# Patient Record
Sex: FEMALE | Race: AMERICAN INDIAN OR ALASKA NATIVE
[De-identification: names, ages, dates, MRNs, and addresses within clinical notes are randomized per-mention and may not be internally consistent; named-entity substitution may affect disease eponyms.]

---

## 2019-01-02 NOTE — EMERGENCY DEPARTMENT REPORT
ED Headache HPI





- General


Chief Complaint: Headache


Stated Complaint: HEADACHE


Time Seen by Provider: 01/02/19 13:53





- History of Present Illness


Initial Comments: 





This is a 23-year-old female nontoxic, well nourished in appearance, no acute 

signs of distress presents to the ED with c/o of intermittent headache x3 days. 

Patient describes headache as diffuse with level of 3 out of 10.  Patient denies

thunderclap headache.  Patient denies any radiation of pain.  Patient denies any

head trauma.  Patient denies any visual changes.  Patient denies worse headache.

 Patient stated that darkness makes headache better and bright lights make the 

headache worse.  Patient also stated that she has been taking OTC extra strength

Tylenol which subsided her headaches.  Patient denies any numbness, tingling, 

fever, chills, nausea, vomiting, chest pain, shortness of breath, stiff neck.  

Patient denies any radiation of pain.  Patient denies any allergies.  Patient 

stated PMH includes HTN.


Timing/Duration: episodic


Quality: mild, achy


Recent Head Trauma: no recent headache/trauma


Associated Symptoms: denies symptoms.  denies: confusion, fatigue, facial pain, 

fever/chills, flushing, loss of consciousness, nausea/vomiting, nasal 

congestion, nasal drainage, numbness in legs/feet, rash, seizures, sinus 

infection, stiff neck, vision changes, weakness


Allergies/Adverse Reactions: 


Allergies





No Known Allergies Allergy (Unverified 01/02/19 13:27)


   








Home Medications: 


Ambulatory Orders





Butalb/Acetamin/Caff -40 [Fioricet] 1 tab PO Q6HR PRN #12 tab 01/02/19 


amLODIPine [Norvasc] 5 mg PO DAILY #30 tab 01/02/19 











ED Review of Systems


ROS: 


Stated complaint: HEADACHE


Other details as noted in HPI





Constitutional: denies: chills, fever


Eyes: denies: eye pain, eye discharge, vision change


ENT: denies: ear pain, throat pain


Respiratory: denies: cough, shortness of breath, wheezing


Cardiovascular: denies: chest pain, palpitations


Endocrine: no symptoms reported


Gastrointestinal: denies: abdominal pain, nausea, diarrhea


Genitourinary: denies: urgency, dysuria, discharge


Musculoskeletal: denies: back pain, joint swelling, arthralgia


Skin: denies: rash, lesions


Neurological: headache.  denies: weakness, paresthesias


Psychiatric: denies: anxiety, depression


Hematological/Lymphatic: denies: easy bleeding, easy bruising





ED Past Medical Hx





- Past Medical History


Hx Hypertension: Yes (does not take med)





- Surgical History


Past Surgical History?: No





- Social History


Smoking Status: Never Smoker


Substance Use Type: None





- Medications


Home Medications: 


                                Home Medications











 Medication  Instructions  Recorded  Confirmed  Last Taken  Type


 


Butalb/Acetamin/Caff -40 1 tab PO Q6HR PRN #12 tab 01/02/19  Unknown Rx





[Fioricet]     


 


amLODIPine [Norvasc] 5 mg PO DAILY #30 tab 01/02/19  Unknown Rx














ED Physical Exam





- General


Limitations: No Limitations


General appearance: alert, in no apparent distress





- Head


Head exam: Present: atraumatic, normocephalic





- Eye


Eye exam: Present: normal appearance, PERRL, EOMI





- Neck


Neck exam: Present: normal inspection, full ROM.  Absent: tenderness, 

meningismus, lymphadenopathy





- Extremities Exam


Extremities exam: Present: normal inspection, full ROM





- Back Exam


Back exam: Present: normal inspection, full ROM





- Neurological Exam


Neurological exam: Present: alert, oriented X3, normal gait





- Expanded Neurological Exam


  ** Expanded


Patient oriented to: Present: person, place, time


Cranial nerves: EOM's Intact: Normal, Facial Sensation: Normal


Cerebellar function: Finger to Nose: Normal


Upper motor neuron: Pronator Drift: Normal, Sensory Extinction: Normal


Sensory exam: Upper Extremity Light Touch: Normal, Upper Extremity Pin Prick: 

Normal, Upper Extremity Temperature: Normal, UE 2 Point Discrimination: Normal, 

Lower Extremity Light Touch: Normal, Lower Extremity Pin Prick: Normal, Lower 

Extremity Temperature: Normal, LE 2 Point Discrimination: Normal


Motor strength exam: RUE: 5, LUE: 5, RLE: 5, LLE: 5


Best Eye Response ([place]): (4) open spontaneously


Best Motor Response ([place]): (6) obeys commands


Best Verbal Response ([place]): (5) oriented


[place] Total: 15





- Psychiatric


Psychiatric exam: Present: normal affect, normal mood





- Skin


Skin exam: Present: warm, dry, intact, normal color.  Absent: rash





ED Course





                                   Vital Signs











  01/02/19





  13:25


 


Temperature 98.6 F


 


Pulse Rate 92 H


 


Respiratory 18





Rate 


 


Blood Pressure 167/108


 


O2 Sat by Pulse 100





Oximetry 














- Reevaluation(s)


Reevaluation #1: 





01/02/19 14:30


Patient is speaking in full sentences with no signs of distress noted.





ED Medical Decision Making





- Medical Decision Making





This is a 23-year-old female that presents with headache and HTN.  Patient is 

stable and was examined by me.  Patient is neurologically stable.  There is no 

stiff neck or neck pain.  Vital signs are stable.  Patient is afebrile.  Patient

 received Benadryl, Reglan, Toradol, and 1 L of normal saline which the patient 

stated that headache has subsided and resolved.  Patient also recevied Catapres 

in the ED.  patient was instructed to keep a daily diary of blood pressure and 

presented to primary care doctor.  Patient was instructed not to operate any 

machinery after discharged due to drowsiness of Benadryl.  Patient stated that a

 family member will drive patient home.  Patient is discharged with Fioricet and

 Norvasc.  Patient was referred to Follow-up with a primary care/neurologist 

doctor in 3-5 days or if symptoms worsen and continue return to emergency room 

as soon as possible.  At time of discharge, the patient does not seem toxic or 

ill in appearance.  No acute signs of distress noted.  Patient agrees to 

discharge treatment plan of care.  No further questions noted by the patient.


Critical care attestation.: 


If time is entered above; I have spent that time in minutes in the direct care 

of this critically ill patient, excluding procedure time.








ED Disposition


Clinical Impression: 


Headache


Qualifiers:


 Headache type: unspecified Headache chronicity pattern: acute headache 

Intractability: not intractable Qualified Code(s): R51 - Headache





Hypertension


Qualifiers:


 Hypertension type: unspecified Qualified Code(s): I10 - Essential (primary) 

hypertension





Disposition: DC-01 TO HOME OR SELFCARE


Is pt being admited?: No


Does the pt Need Aspirin: No


Condition: Stable


Instructions:  Hypertension (ED), Low Sodium Diet (ED)


Additional Instructions: 


Follow-up with a primary care doctor in 3-5 days or if symptoms worsen and 

continue return to emergency room as soon as possible. 


Keep a daily diary of your blood pressure and present it to primary care doctor.


Prescriptions: 


amLODIPine [Norvasc] 5 mg PO DAILY #30 tab


Butalb/Acetamin/Caff -40 [Fioricet] 1 tab PO Q6HR PRN #12 tab


 PRN Reason: Headache


Referrals: 


PRIMARY CARE,MD [Primary Care Provider] - 3-5 Days


BREANNE CASANOVA MD [Staff Physician] - 3-5 Days


[facility] [Outside] - 3-5 Days


[facility] [Outside] - 3-5 Days


Forms:  Work/School Release Form(ED)

## 2019-03-31 NOTE — EMERGENCY DEPARTMENT REPORT
Minor Respiratory





- HPI


Chief Complaint: Sore Throat


Stated Complaint: SORE THROAT


Time Seen by Provider: 03/31/19 00:07


Duration: 3 Days


Pain Location: Throat


Severity: mild


Minor Respiratory: Yes Sore Throat, Yes Able to Tolerate Fluids, Yes Fever, No 

Rhinorrhea, No Ear Pain, No Cough, No Sick Contacts, No Hemoptysis, No Chest 

Pain, No Shortness of Breath


Other History: SORE THROAT FOR 3 DAYS. FEVER. ABC INTACT. VSS.  CONTROLLING 

SECRETIONS.  PMH.  HTN.  RX.  NONE





ED Review of Systems


ROS: 


Stated complaint: SORE THROAT


Other details as noted in HPI





Comment: All other systems reviewed and negative


Constitutional: see HPI, fever.  denies: chills


Eyes: denies: eye pain


ENT: as per HPI, throat pain


Respiratory: denies: orthopnea


Cardiovascular: denies: palpitations


Endocrine: denies: flushing


Gastrointestinal: denies: nausea


Genitourinary: denies: dysuria


Musculoskeletal: denies: back pain


Skin: denies: lesions


Neurological: denies: headache


Psychiatric: denies: anxiety


Hematological/Lymphatic: denies: easy bleeding





ED Past Medical Hx





- Past Medical History


Previous Medical History?: Yes


Hx Hypertension: Yes


Additional medical history: Obesity





- Surgical History


Past Surgical History?: No





- Family History


Family history: no significant





- Social History


Smoking Status: Never Smoker


Substance Use Type: None





- Medications


Home Medications: 


                                Home Medications











 Medication  Instructions  Recorded  Confirmed  Last Taken  Type


 


Amoxicillin 500 mg PO BID #20 capsule 03/31/19  Unknown Rx














Minor Respiratory Exam





- Exam


General: 


Vital signs noted. No distress. Alert and acting appropriately.





HEENT: Yes Pharyngeal Erythema, Yes Pharyngeal Exudates, Yes Moist Mucous 

Membranes, No Rhinorrhea, No Conjuctival Injection, No Frontal Tenderness, No 

Maxillary Tenderness


Ear: Neither TM Bulge, Neither TM Erythema, Neither EAC Pain, Neither EAC 

Discharge


Neck: No Adenopathy, No Supple


Lungs: Yes Good Air Exchange, No Wheezes, No Ronchi, No Stridor, No Cough, No 

Labored Respirations, No Retractions, No Use of Accessory Muscles, No Other 

Abnormal Lung Sounds


Heart: Yes Regular, No Murmur


Abdomen: Yes Normal Bowel Sounds, No Tenderness, No Peritoneal Signs


Skin: No Rash, No Edema


Neurologic: 


Alert and oriented, no deficits.








Musculoskeletal: 


Unremarkable.











ED Course


                                   Vital Signs











  03/30/19





  23:33


 


Temperature 100.1 F H


 


Pulse Rate 111 H


 


Respiratory 18





Rate 


 


Blood Pressure 171/105


 


O2 Sat by Pulse 98





Oximetry 














ED Medical Decision Making





- Medical Decision Making





                                   Vital Signs











  03/30/19





  23:33


 


Temperature 100.1 F H


 


Pulse Rate 111 H


 


Respiratory 18





Rate 


 


Blood Pressure 171/105


 


O2 Sat by Pulse 98





Oximetry 








MEDICATED IN ER





HAS NOT TAKEN HER BP MEDS BECAUSE HER THROAT HURTS


CLONIDINE PO IN ER





DC HOME WITH DC PLAN OF CARE AND FOLLOW UP WITH PCP


Critical care attestation.: 


If time is entered above; I have spent that time in minutes in the direct care 

of this critically ill patient, excluding procedure time.








ED Disposition


Clinical Impression: 


 Exudative pharyngitis, HTN (hypertension)





Disposition: DC-01 TO HOME OR SELFCARE


Is pt being admited?: No


Does the pt Need Aspirin: No


Condition: Stable


Instructions:  Pharyngitis (ED), Hypertension (ED)


Additional Instructions: 


HYDRATE WELL WITH WATER





FOLLOW UP PCP IF PERSISTS





ACTIVITY AS TOLERATED





DIET AS TOLERATED





MED AS ORDERED





MOTRIN OR TYLENOL FOR PAIN OR FEVER














Prescriptions: 


Amoxicillin 500 mg PO BID #20 capsule


Referrals: 


[facility] [Outside] - 3-5 Days


Time of Disposition: 00:12

## 2019-08-11 NOTE — EMERGENCY DEPARTMENT REPORT
- General


Chief Complaint: Wound/Laceration


Stated Complaint: GASH ON LEFT ARM, HIT BY METAL DOOR


Time Seen by Provider: 19 00:51


Source: patient


Mode of arrival: Ambulatory


Limitations: No Limitations





- History of Present Illness


Initial Comments: 


Pt is a 24-year-old female who presents for left forearm laceration versus 

metabolic at work last tetanus is unknown and is 2 cm bleeding controlled by 

direct pressure applied at work there is no numbness tingling no no tendon or 

muscle damage range of motion remains intact


 


Onset/Timin


-: hour(s)


Extremity Location: Left: Forearm


Place: work


Patient Tetanus UTD: No


Context: accidental


Associated Symptoms: pain





- Related Data


                                  Previous Rx's











 Medication  Instructions  Recorded  Last Taken  Type


 


Amoxicillin 500 mg PO BID #20 capsule 19 Unknown Rx


 


cephALEXin [Keflex] 500 mg PO Q8HR 10 Days #30 cap 19 Unknown Rx


 


traMADol [Ultram] 50 mg PO Q6HR PRN #12 tablet 19 Unknown Rx











                                    Allergies











Allergy/AdvReac Type Severity Reaction Status Date / Time


 


No Known Allergies Allergy   Verified 19 14:32














ED Review of Systems


ROS: 


Stated complaint: GASH ON LEFT ARM, HIT BY METAL DOOR


Other details as noted in HPI





Constitutional: denies: chills, fever


Eyes: denies: eye pain, eye discharge, vision change


ENT: denies: ear pain, throat pain


Respiratory: denies: cough, shortness of breath, wheezing


Cardiovascular: denies: chest pain, palpitations


Endocrine: no symptoms reported


Gastrointestinal: denies: abdominal pain, nausea, vomiting, diarrhea


Genitourinary: denies: urgency, dysuria, discharge


Musculoskeletal: denies: back pain, joint swelling, arthralgia


Skin: other (laceration 2 cm left foream ).  denies: rash, lesions


Neurological: denies: headache, weakness, paresthesias


Psychiatric: denies: anxiety, depression


Hematological/Lymphatic: denies: easy bleeding, easy bruising





ED Past Medical Hx





- Past Medical History


Previous Medical History?: Yes


Hx Hypertension: Yes


Additional medical history: Obesity





- Surgical History


Past Surgical History?: No





- Social History


Smoking Status: Never Smoker


Substance Use Type: None





- Medications


Home Medications: 


                                Home Medications











 Medication  Instructions  Recorded  Confirmed  Last Taken  Type


 


Amoxicillin 500 mg PO BID #20 capsule 19  Unknown Rx


 


cephALEXin [Keflex] 500 mg PO Q8HR 10 Days #30 cap 19  Unknown Rx


 


traMADol [Ultram] 50 mg PO Q6HR PRN #12 tablet 19  Unknown Rx














ED Physical Exam





- General


Limitations: No Limitations


General appearance: alert, in no apparent distress





- Head


Head exam: Present: atraumatic, normocephalic





- Eye


Eye exam: Present: normal appearance, PERRL, EOMI


Pupils: Present: normal accommodation





- ENT


ENT exam: Present: mucous membranes moist





- Neck


Neck exam: Present: normal inspection, tenderness, full ROM, lymphadenopathy





- Respiratory


Respiratory exam: Present: normal lung sounds bilaterally.  Absent: wheezes, 

stridor, chest wall tenderness





- Cardiovascular


Cardiovascular Exam: Present: regular rate, normal rhythm, normal heart sounds. 

 Absent: systolic murmur, diastolic murmur, rubs, gallop





- GI/Abdominal


GI/Abdominal exam: Present: soft, normal bowel sounds.  Absent: distended, 

tenderness, bruit, hernia





- Rectal


Rectal exam: Present: deferred





- Extremities Exam


Extremities exam: Present: normal inspection, full ROM, tenderness (left forearm

 laceration), normal capillary refill.  Absent: pedal edema, joint swelling





- Expanded Upper Extremity Exam


  ** Left


General: Present: laceration


Forearm Wrist exam: Present: full ROM, tenderness, laceration.  Absent: 

swelling, abrasion, ecchymosis, deformity, crepidus, dislocation, erythema, 

tenderness over anatomical snuff box, pain with axial thumb loading


Neuro motor exam: Present: wrist extension intact, thumb opposition intact, 

thumb IP flexion intact, thumb adduction intact, fingers 2-5 abduction intact


Neurosensory exam: Present: 2-point discrimination, radial nerve intact, ulnar 

nerve intact, median nerve intact


Vascular: Present: normal capillary refill, radial pulse, brachial pulse, ulnar 

pulse.  Absent: pulse deficit radial art, pulse deficit ulnar art, pulse deficit

 brachial art





- Back Exam


Back exam: Present: normal inspection, full ROM, muscle spasm.  Absent: tender

ness, CVA tenderness (R), CVA tenderness (L), paraspinal tenderness, vertebral 

tenderness, rash noted





- Neurological Exam


Neurological exam: Present: alert, oriented X3, CN II-XII intact, normal gait, 

reflexes normal.  Absent: motor sensory deficit





- Psychiatric


Psychiatric exam: Present: normal affect, normal mood





- Skin


Skin exam: Present: warm, dry, intact, normal color.  Absent: rash





ED Course





                                   Vital Signs











  08/10/19 08/10/19 08/11/19





  23:03 23:15 01:14


 


Temperature 98.3 F  


 


Pulse Rate 98 H  


 


Respiratory 20  16





Rate   


 


Blood Pressure 191/105  


 


Blood Pressure  177/102 





[Left]   


 


O2 Sat by Pulse 99  





Oximetry   














- Laceration /Wound Repair


  ** Left Posterior Arm


Wound Location: upper extremity


Wound Length (cm): 2


Wound's Depth, Shape: superficial


Wound Explored: clean


Irrigated w/ Saline (ccs): 20


Betadine Prep?: Yes


Anesthesia: 1% Lidocaine


Volume Anesthetic (ccs): 2


Wound Debrided: none required 


Wound Repaired With: sutures


Suture Size/Type: 4:0, proline


Number of Sutures: 7 (running )


Layer Closure?: No


Sterile Dressing Applied?: Yes


Progress: 


Left forearm laceration 2 cm posterior forearm wound clean and Betadine solution

 and anesthesia with 1% lidocaine plain 2 mL 20 mL sterile saline no foreign 

bodies noted warm male explored with blunt forceps were closed with 3. 0 Prolene

 7 sutures running edges are well approximated wound was contrasted dressing 

applied there is no nerve muscle or tendon damage CMS is intact patient 

instructions. 








ED Medical Decision Making





- Medical Decision Making





 pt for laceration repair see procedure note, all bleeding is controlled tetanus

 given, pt will follow up with pcp in 2 days for wound check  and 7-10 days for 

suture removal pt verbalized agreement and understanding of same.  pt with htn 

episode, no symptoms no cp no sob no dizziness lightheadedness no back no n/v 


Critical care attestation.: 


If time is entered above; I have spent that time in minutes in the direct care 

of this critically ill patient, excluding procedure time.








ED Disposition


Clinical Impression: 


Laceration of forearm, left


Qualifiers:


 Encounter type: initial encounter Qualified Code(s): S51.812A - Laceration 

without foreign body of left forearm, initial encounter





Disposition: - TO HOME OR SELFCARE


Is pt being admited?: No


Does the pt Need Aspirin: No


Condition: Stable


Instructions:  Laceration (ED), Suture Care (ED)


Prescriptions: 


cephALEXin [Keflex] 500 mg PO Q8HR 10 Days #30 cap


traMADol [Ultram] 50 mg PO Q6HR PRN #12 tablet


 PRN Reason: Pain


Referrals: 


[facility], [facility] [Primary Care Provider] - 3-5 Days


Forms:  Work/School Release Form(ED)


Time of Disposition: 01:50

## 2019-08-19 NOTE — EMERGENCY DEPARTMENT REPORT
Suture/Staple Removal





- HPI


Chief Complaint: Medical Clearance


Stated Complaint: SUTURE REMOVAL


Time Seen by Provider: 08/19/19 11:57


When Sutures or Staples Placed: 5-7 Days Ago


Wound Location: left forearm 





ED Review of Systems


ROS: 


Stated complaint: SUTURE REMOVAL


Other details as noted in HPI





Comment: All other systems reviewed and negative





ED Past Medical Hx





- Past Medical History


Hx Hypertension: Yes


Additional medical history: Obesity





- Social History


Smoking Status: Never Smoker


Substance Use Type: None





- Medications


Home Medications: 


                                Home Medications











 Medication  Instructions  Recorded  Confirmed  Last Taken  Type


 


Amoxicillin 500 mg PO BID #20 capsule 03/31/19  Unknown Rx


 


cephALEXin [Keflex] 500 mg PO Q8HR 10 Days #30 cap 08/11/19  Unknown Rx


 


traMADol [Ultram] 50 mg PO Q6HR PRN #12 tablet 08/11/19  Unknown Rx














Suture Removal Exam





- Exam


General: 


Vital signs noted. No distress. Alert and acting appropriately.





Wound: No Pathologic Erythema, No Tenderness, No Drainage, No Pus, No Wound 

Dehiscence


Other Systems: 


All other systems reviewed and are unremarkable.








ED Recheck MDM





- Differential Diagnosis


Suture/Staple Removal


Critical care attestation.: 


If time is entered above; I have spent that time in minutes in the direct care 

of this critically ill patient, excluding procedure time.








ED Disposition


Clinical Impression: 


 Visit for suture removal





Disposition: DC-01 TO HOME OR SELFCARE


Is pt being admited?: No


Does the pt Need Aspirin: No


Condition: Stable


Instructions:  Suture Removal (ED)

## 2020-03-29 ENCOUNTER — HOSPITAL ENCOUNTER (EMERGENCY)
Dept: HOSPITAL 5 - ED | Age: 25
LOS: 1 days | Discharge: HOME | End: 2020-03-30
Payer: SELF-PAY

## 2020-03-29 VITALS — SYSTOLIC BLOOD PRESSURE: 153 MMHG | DIASTOLIC BLOOD PRESSURE: 94 MMHG

## 2020-03-29 DIAGNOSIS — L02.213: Primary | ICD-10-CM

## 2020-03-29 DIAGNOSIS — Z79.899: ICD-10-CM

## 2020-03-29 DIAGNOSIS — I10: ICD-10-CM

## 2020-03-29 NOTE — EMERGENCY DEPARTMENT REPORT
Abscess Boil HPI





- HPI


Chief Complaint: Skin/Abscess/Foreign Body


Stated Complaint: KNOT IN CHEST CAUSING CHEST PAIN X3 DAYS


Time Seen by Provider: 03/29/20 23:08


Location: Chest


Severity: Moderate


History: Yes Pain (Mid chest), No Fever (6/10 with touch and movement), No 

Purulent Drainage, No Numbness, No Foreign Body, No Previous History, No Insect 

Bite


HPI: Patient here report that she has not in her mid chest area since last 

Thursday.  She reports that it is red and swollen and painful to touch and when 

she moves her arms.  Tetanus vaccine is less than 2 years.  Pain is sharp and 

worse with movement and touch.  No medication taken for pain.  Pain is intermitt

ent.  Denies any cough, shortness of breath, sore throat, nausea, vomiting, 

diarrhea or fevers or chills.


Home Medications: 


                                  Previous Rx's











 Medication  Instructions  Recorded  Last Taken  Type


 


Amoxicillin 500 mg PO BID #20 capsule 03/31/19 Unknown Rx


 


cephALEXin [Keflex] 500 mg PO Q8HR 10 Days #30 cap 08/11/19 Unknown Rx


 


traMADoL [Ultram] 50 mg PO Q6HR PRN #12 tablet 08/11/19 Unknown Rx


 


Clindamycin [Clindamycin CAP] 300 mg PO Q8H 10 Days #30 cap 03/29/20 Unknown Rx


 


Ibuprofen [Motrin] 800 mg PO Q8HR PRN #12 tablet 03/29/20 Unknown Rx











Allergies/Adverse Reactions: 


                                    Allergies











Allergy/AdvReac Type Severity Reaction Status Date / Time


 


No Known Allergies Allergy   Verified 01/02/19 14:32














ED Review of Systems


ROS: 


Stated complaint: KNOT IN CHEST CAUSING CHEST PAIN X3 DAYS


Other details as noted in HPI





Constitutional: denies: chills, fever


ENT: denies: throat pain, congestion


Respiratory: denies: cough, shortness of breath, wheezing


Cardiovascular: denies: chest pain, palpitations, edema, syncope


Gastrointestinal: denies: nausea, vomiting


Musculoskeletal: denies: back pain, joint swelling, arthralgia, myalgia


Skin: other (Not on chest)


Neurological: denies: headache





ED Past Medical Hx





- Past Medical History


Previous Medical History?: Yes


Hx Hypertension: Yes


Additional medical history: Obesity





- Surgical History


Past Surgical History?: No





- Family History


Family history: no significant





- Social History


Smoking Status: Never Smoker


Substance Use Type: None





- Medications


Home Medications: 


                                Home Medications











 Medication  Instructions  Recorded  Confirmed  Last Taken  Type


 


Amoxicillin 500 mg PO BID #20 capsule 03/31/19  Unknown Rx


 


cephALEXin [Keflex] 500 mg PO Q8HR 10 Days #30 cap 08/11/19  Unknown Rx


 


traMADoL [Ultram] 50 mg PO Q6HR PRN #12 tablet 08/11/19  Unknown Rx


 


Clindamycin [Clindamycin CAP] 300 mg PO Q8H 10 Days #30 cap 03/29/20  Unknown Rx


 


Ibuprofen [Motrin] 800 mg PO Q8HR PRN #12 tablet 03/29/20  Unknown Rx














ED Abscess Boil Physical Exam





- Exam


General: 


Vital signs noted. No distress. Alert and acting appropriately.


This is a 25-year-old female well-nourished well-developed in no acute distress.


Front/Back of Body, Lg (Color): 


                            __________________________














                            __________________________





 1 - Red, indurated, 1 x 1 cm round area to midsternal area midline to nipples 

on both side.  Tender to palpate and no drainage.  Please see procedure note for

 detail incision and drainage





Size: 1 cm


Exam: Yes Tenderness, Yes Fluctuance (Minimal), Yes Surrounding Cellulites/Eryth

ema (Minimal), Yes Normal Neurologic Exam (Alert and oriented x3, normal gait.  

GCS 15), Yes Normal Circulation (No cce. + 2 pulses in all extremities, no 

neurovascular compromise), No Lymphangitis, No Crepitation, No Heart Murmur (S1-

S2, regular rate and rhythm)


Exam: Lungs: Clear to auscultate bilaterally, no rhonchi wheezes or rails





I & D Note





- I & D Note


I & D Note: Incision and drainage procedure.  I aspirated 3 cc of pus using 18-

gauge needle at abscess size to mid chest.  Prior to aspiration. area cleansed 

and numbed with lidocaine 1%, 1 cc.  Patient tolerated procedure well.  Area 

cleaned after procedure with iodine and normal saline and sterile dry dressing 

placed aside.





ED Course


                                   Vital Signs











  03/29/20 03/29/20 03/29/20





  23:48 23:53 23:54


 


Temperature 100.3 F H  


 


Pulse Rate 103 H  94 H


 


Respiratory 18  





Rate   


 


Blood Pressure 153/94  





[Left]   


 


O2 Sat by Pulse 106 H 100 





Oximetry   














- Reevaluation(s)


Reevaluation #1: 





03/29/20 23:40


Patient stable throughout ED course.  Please see procedure note for details on 

drainage of abscess








Reevaluation #2: 





03/29/20 23:42


Motrin 800 mg and clindamycin 600 mg p.o. in emergency room





Critical care attestation.: 


If time is entered above; I have spent that time in minutes in the direct care 

of this critically ill patient, excluding procedure time.








ED Medical Decision Making





- Medical Decision Making





Patient with small abscess to midsternal area.  Please see procedure note for 

details on drainage.  Patient tolerated procedure well.  Patient given Motrin 

800 mg p.o. and clindamycin 600 mg p.o. in emergency room prior to discharge.  

She is stable and says she felt much better after aspiration.  Vital signs 

stable she is afebrile and she is to follow-up with her primary care physician 

in 2 to 3 days.  Discharged home with prescription for Motrin and clindamycin.





ED Disposition


Clinical Impression: 


 Abscess or cellulitis of chest wall





Disposition: DC-01 TO HOME OR SELFCARE


Is pt being admited?: No


Does the pt Need Aspirin: No


Condition: Stable


Instructions:  Cellulitis (ED), Abscess Incision and Drainage (ED)


Additional Instructions: 


Please keep affected area clean and dry.


Apply warm compresses at least 3 times a day to affected site to facilitate 

softening and drainage.


If condition worsens, return to the emergency room


Follow-up with your primary care physician in 4 days.


Take medication as prescribed





Prescriptions: 


Clindamycin [Clindamycin CAP] 300 mg PO Q8H 10 Days #30 cap


Ibuprofen [Motrin] 800 mg PO Q8HR PRN #12 tablet


 PRN Reason: pain


Referrals: 


ALEXANDRE CHAN MD [Primary Care Provider] - 04/03/20


Forms:  Work/School Release Form(ED)

## 2021-07-23 ENCOUNTER — HOSPITAL ENCOUNTER (EMERGENCY)
Dept: HOSPITAL 5 - ED | Age: 26
LOS: 1 days | Discharge: HOME | End: 2021-07-24
Payer: COMMERCIAL

## 2021-07-23 VITALS — DIASTOLIC BLOOD PRESSURE: 118 MMHG | SYSTOLIC BLOOD PRESSURE: 182 MMHG

## 2021-07-23 DIAGNOSIS — I10: ICD-10-CM

## 2021-07-23 DIAGNOSIS — S90.01XA: ICD-10-CM

## 2021-07-23 DIAGNOSIS — S39.012A: Primary | ICD-10-CM
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
## 2021-07-23 DIAGNOSIS — M79.642: ICD-10-CM
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
## 2021-07-23 DIAGNOSIS — M25.511: ICD-10-CM
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
## 2021-07-23 DIAGNOSIS — S60.222A: ICD-10-CM

## 2021-07-23 DIAGNOSIS — M25.571: ICD-10-CM
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
## 2021-07-23 DIAGNOSIS — S43.401A: ICD-10-CM

## 2021-07-23 DIAGNOSIS — Z79.899: ICD-10-CM

## 2021-07-23 DIAGNOSIS — E66.9: ICD-10-CM

## 2021-07-23 PROCEDURE — 72100 X-RAY EXAM L-S SPINE 2/3 VWS: CPT

## 2021-07-24 NOTE — XRAY REPORT
RIGHT ANKLE 3 VIEWS



INDICATION / CLINICAL INFORMATION:

right ankle pain



COMPARISON:

None available.

 

FINDINGS:



BONES and JOINT(S): No acute fracture or subluxation. No significant arthritis.

SOFT TISSUES: No significant abnormality.



ADDITIONAL FINDINGS: None.



IMPRESSION:

1. No acute findings.



Signer Name: Kole Francois MD 

Signed: 7/24/2021 1:17 AM

Workstation Name: [id number]

## 2021-07-24 NOTE — XRAY REPORT
LUMBAR SPINE 3 VIEWS



INDICATION:

Lower back pain.



COMPARISON:

No relevant prior imaging study available. 



FINDINGS:



VERTEBRAE: No acute fracture. Normal alignment. 

DISC SPACES: No significant abnormality. 

FACET JOINTS: No significant abnormality. 

SOFT TISSUES: No significant abnormality.



ADDITIONAL FINDINGS: No additional significant findings. 



IMPRESSION:

1.  No acute findings.



Signer Name: Kole Francois MD 

Signed: 7/24/2021 1:17 AM

Workstation Name: [id number]

## 2021-07-24 NOTE — EMERGENCY DEPARTMENT REPORT
ED Fall HPI





- General


Chief Complaint: Multiple Trauma


Stated Complaint: FALL


Time Seen by Provider: 07/24/21 00:30


Source: patient


Mode of arrival: Ambulatory





- History of Present Illness


Initial Comments: 





Patient is a 26-year-old female that presents emergency room with complaints of 

fall.  Patient states she was in her attic change in her air conditioning 

filters  and she fell through the ceiling.  Patient states that she landed on 

her right ankle and injured her right shoulder and twisted her lower back and 

hurt her left hand.  Patient states her pain is 10/10.  States her pain is 

better with rest.  Patient states her pain is worse with palpation and 

movement..  Patient denies hitting her head.  Patient denies loss of 

consciousness.  Patient states she is just in pain.  Patient denies chest pain. 

Patient denies shortness of breath or patient denies neck pain.  Patient denies 

blurry vision.  Patient denies fever or chills.  Patient planes of right ankle 

pain, right shoulder pain, lower back pain, left hand pain.








Patient denies recent travel.  Patient denies recent international travel.  

Patient denies exposure to the novel coronavirus.  Patient denies sick contacts.

 Patient denies fever and chills.  Patient denies cough.  Patient denies 

diarrhea.  Patient denies coming in contact with anybody with symptoms of the 

novel coronavirus.








MD Complaint: fall


-: Sudden, hour(s)


Fall From: from height (distance)


When Fall Occurred: 1 hour PTA


Fall Witnessed: yes, by family


Loss of Consciousness: none


Prolonged Down Time?: no


Symptoms Prior to Fall: none


Location: back


Location - Extremities: Left: Hand, Right: Shoulder, Foot


Severity: severe


Severity scale (0 -10): 10


Quality: sharp


Context: tripped/slipped


Associated Symptoms: denies: headache, neck pain, numbness, weakness, chest 

paint, shortness of breath, abdominal pain, hematuria, unable to walk, 

lightheaded, vertigo, confusion





- Related Data


                                  Previous Rx's











 Medication  Instructions  Recorded  Last Taken  Type


 


Amoxicillin 500 mg PO BID #20 capsule 03/31/19 Unknown Rx


 


cephALEXin [Keflex] 500 mg PO Q8HR 10 Days #30 cap 08/11/19 Unknown Rx


 


traMADoL [Ultram] 50 mg PO Q6HR PRN #12 tablet 08/11/19 Unknown Rx


 


Clindamycin [Clindamycin CAP] 300 mg PO Q8H 10 Days #30 cap 03/29/20 Unknown Rx


 


Ibuprofen [Motrin 800 MG tab] 800 mg PO Q8HR PRN #12 tablet 07/24/21 Unknown Rx


 


Metaxalone [Skelaxin] 800 mg PO TID PRN #15 tablet 07/24/21 Unknown Rx


 


oxyCODONE /ACETAMINOPHEN [Percocet 1 tab PO Q4HR PRN #12 tab 07/24/21 Unknown Rx





5/325]    











                                    Allergies











Allergy/AdvReac Type Severity Reaction Status Date / Time


 


No Known Allergies Allergy   Verified 01/02/19 14:32














ED Review of Systems


ROS: 


Stated complaint: FALL


Other details as noted in HPI





Constitutional: denies: chills, fever


Eyes: denies: eye pain, eye discharge, vision change


ENT: denies: ear pain, throat pain


Respiratory: denies: cough, shortness of breath, wheezing


Cardiovascular: denies: chest pain, palpitations


Endocrine: no symptoms reported


Gastrointestinal: denies: abdominal pain, nausea, diarrhea


Genitourinary: denies: urgency, dysuria, discharge


Musculoskeletal: as per HPI, back pain.  denies: joint swelling, arthralgia


Skin: denies: rash, lesions


Neurological: denies: headache, weakness, paresthesias


Psychiatric: denies: anxiety, depression


Hematological/Lymphatic: denies: easy bleeding, easy bruising





ED Past Medical Hx





- Past Medical History


Previous Medical History?: Yes


Hx Hypertension: Yes


Additional medical history: Obesity





- Surgical History


Past Surgical History?: No





- Family History


Family history: no significant





- Social History


Smoking Status: Never Smoker


Substance Use Type: None





- Medications


Home Medications: 


                                Home Medications











 Medication  Instructions  Recorded  Confirmed  Last Taken  Type


 


Amoxicillin 500 mg PO BID #20 capsule 03/31/19  Unknown Rx


 


cephALEXin [Keflex] 500 mg PO Q8HR 10 Days #30 cap 08/11/19  Unknown Rx


 


traMADoL [Ultram] 50 mg PO Q6HR PRN #12 tablet 08/11/19  Unknown Rx


 


Clindamycin [Clindamycin CAP] 300 mg PO Q8H 10 Days #30 cap 03/29/20  Unknown Rx


 


Ibuprofen [Motrin 800 MG tab] 800 mg PO Q8HR PRN #12 tablet 07/24/21  Unknown Rx


 


Metaxalone [Skelaxin] 800 mg PO TID PRN #15 tablet 07/24/21  Unknown Rx


 


oxyCODONE /ACETAMINOPHEN [Percocet 1 tab PO Q4HR PRN #12 tab 07/24/21  Unknown 

Rx





5/325]     














ED Physical Exam





- General


Limitations: No Limitations


General appearance: alert, in no apparent distress





- Head


Head exam: Present: atraumatic, normocephalic





- Eye


Eye exam: Present: normal appearance





- ENT


ENT exam: Present: mucous membranes moist





- Neck


Neck exam: Present: normal inspection





- Respiratory


Respiratory exam: Present: normal lung sounds bilaterally.  Absent: respiratory 

distress, wheezes, rales





- Cardiovascular


Cardiovascular Exam: Present: regular rate, normal rhythm.  Absent: systolic 

murmur, diastolic murmur, rubs, gallop





- GI/Abdominal


GI/Abdominal exam: Present: soft, normal bowel sounds.  Absent: distended, 

tenderness, guarding





- Extremities Exam


Extremities exam: Present: normal inspection, tenderness (Right foot, left third

digit, right shoulder)





- Back Exam


Back exam: Present: normal inspection, tenderness, paraspinal tenderness





- Neurological Exam


Neurological exam: Present: alert, oriented X3





- Psychiatric


Psychiatric exam: Present: normal affect, normal mood





- Skin


Skin exam: Present: warm, dry, intact, normal color.  Absent: rash





ED Course


                                   Vital Signs











  07/23/21





  19:23


 


Temperature 99.4 F


 


Pulse Rate 120 H


 


Respiratory 18





Rate 


 


Blood Pressure 182/118


 


O2 Sat by Pulse 100





Oximetry 














- Reevaluation(s)


Reevaluation #1: 


Patient states her pain is better.  Patient was given 5 mg of Percocet.  





I discussed all results and clinical findings with patient.  I discussed plan of

care with patient.  Patient agrees with plan of care.  Patient is stable for 

discharge.  Patient will be discharged home.  Patient given discharge 

instructions.  Patient voiced understanding of discharge instructions.


07/24/21 01:30








ED Medical Decision Making





- Radiology Data


Radiology results: report reviewed, image reviewed


interpreted by me: 





Hand x-ray, lumbar x-ray, right shoulder x-ray, right ankle x-ray: No fracture, 

soft tissue normal, no foreign body.








=========================================================================


LEFT HAND 3 VIEWS  


 


 INDICATION / CLINICAL INFORMATION:  


 Left ring finger pain  


 


 COMPARISON:  


 None available.  


 


 FINDINGS:  


 


 BONES and JOINT(S): No acute fracture or subluxation. No significant arthritis.

 


 SOFT TISSUES: No significant abnormality.  


 


 ADDITIONAL FINDINGS: None.  


 


 IMPRESSION:  


 1. No acute findings.  


=========================================================================


 


RIGHT ANKLE 3 VIEWS  


 


 INDICATION / CLINICAL INFORMATION:  


 right ankle pain  


 


 COMPARISON:  


 None available.  


 


 FINDINGS:  


 


 BONES and JOINT(S): No acute fracture or subluxation. No significant arthritis.

 


 SOFT TISSUES: No significant abnormality.  


 


 ADDITIONAL FINDINGS: None.  


 


 IMPRESSION:  


 1. No acute findings.  


 


=========================================================================


RIGHT SHOULDER 3 VIEWS  


 


 INDICATION / CLINICAL INFORMATION:  


 right shoulder pain  


 


 COMPARISON:  


 None available.  


 


 FINDINGS:  


 


 BONES and JOINT(S): No acute fracture or subluxation. No significant arthritis.

 


 SOFT TISSUES: No significant abnormality.  


 


 ADDITIONAL FINDINGS: None.  


 


 IMPRESSION:  


 1. No acute findings.  


=========================================================================


 


LUMBAR SPINE 3 VIEWS  


 


 INDICATION:  


 Lower back pain.  


 


 COMPARISON:  


 No relevant prior imaging study available.   


 


 FINDINGS:  


 


 VERTEBRAE: No acute fracture. Normal alignment.   


 DISC SPACES: No significant abnormality.   


 FACET JOINTS: No significant abnormality.   


 SOFT TISSUES: No significant abnormality.  


 


 ADDITIONAL FINDINGS: No additional significant findings.   


 


 IMPRESSION:  


 1.  No acute findings.  


=========================================================================














- Medical Decision Making





Patient is a 26-year-old female with a fell through her ceiling at home while 

changing her filters and injured her left hand, right shoulder, lower back and 

right ankle.  Patient had x-rays done of each individual site.  Patient x-rays 

were negative for acute finding.  Personally reviewed all x-rays.  Patient given

a 5 mg Percocet and responded well and the pain decreased.  Patient is stable 

for discharge.  Patient not require inpatient services.  Patient not require 

further emergency medical services.  patient given discharge instructions.  

Patient instructed to follow-up with orthopedist.











- Differential Diagnosis


Sprain, strain, fracture, fall. shoulder, hand, ankle, lower back pain


Critical care attestation.: 


If time is entered above; I have spent that time in minutes in the direct care 

of this critically ill patient, excluding procedure time.








ED Disposition


Clinical Impression: 


 Left hand pain, Strain of tendon of lower back





Fall


Qualifiers:


 Encounter type: initial encounter Qualified Code(s): W19.XXXA - Unspecified fal

l, initial encounter





Ankle pain


Qualifiers:


 Chronicity: acute Laterality: right Qualified Code(s): M25.571 - Pain in right 

ankle and joints of right foot





Right shoulder pain


Qualifiers:


 Chronicity: acute Qualified Code(s): M25.511 - Pain in right shoulder





Lower back pain


Qualifiers:


 Chronicity: acute Back pain laterality: bilateral Sciatica presence: without 

sciatica Qualified Code(s): M54.5 - Low back pain





Contusion of right ankle


Qualifiers:


 Encounter type: initial encounter Qualified Code(s): S90.01XA - Contusion of 

right ankle, initial encounter





Sprain of right shoulder


Qualifiers:


 Encounter type: initial encounter Shoulder sprain type: unspecified sprain 

Qualified Code(s): S43.401A - Unspecified sprain of right shoulder joint, 

initial encounter





Contusion of left hand


Qualifiers:


 Encounter type: initial encounter Qualified Code(s): S60.222A - Contusion of 

left hand, initial encounter





Disposition: DC-01 TO HOME OR SELFCARE


Is pt being admited?: No


Does the pt Need Aspirin: No


Condition: Stable


Instructions:  Muscle Strain, Easy-to-Read, Shoulder Pain, Acute Back Pain, 

Adult, Hand Pain, Back Exercises, Easy-to-Read, Hand Contusion, Easy-to-Read


Additional Instructions: 


Patient to follow-up with primary care in 2 to 3 days.  Patient to follow-up 

with orthopedist in 2 to 3 days.  Patient to rest.  Patient to increase water.  

Patient to avoid strenuous exercise or heavy lifting until cleared by 

orthopedist and primary care.  Patient to avoid work until cleared by 

orthopedist.  Patient to take Tylenol or ibuprofen as needed for pain.  Patient 

to take meds as directed.  Patient to return to the ER if condition worsens, 

changes or new symptoms arise.


Prescriptions: 


Ibuprofen [Motrin 800 MG tab] 800 mg PO Q8HR PRN #12 tablet


 PRN Reason: pain


oxyCODONE /ACETAMINOPHEN [Percocet 5/325] 1 tab PO Q4HR PRN #12 tab


 PRN Reason: Pain , Severe (7-10)


Metaxalone [Skelaxin] 800 mg PO TID PRN #15 tablet


 PRN Reason: Muscle Spasm


Referrals: 


[facility] [[facility] - 2-3 Days


TAVIA SKINNER MD [Staff Physician] - 2-3 Days


Time of Disposition: 01:48

## 2021-07-24 NOTE — XRAY REPORT
LEFT HAND 3 VIEWS



INDICATION / CLINICAL INFORMATION:

Left ring finger pain



COMPARISON:

None available.

 

FINDINGS:



BONES and JOINT(S): No acute fracture or subluxation. No significant arthritis.

SOFT TISSUES: No significant abnormality.



ADDITIONAL FINDINGS: None.



IMPRESSION:

1. No acute findings.



Signer Name: Kole Francois MD 

Signed: 7/24/2021 1:19 AM

Workstation Name: [id number]

## 2021-07-24 NOTE — XRAY REPORT
RIGHT SHOULDER 3 VIEWS



INDICATION / CLINICAL INFORMATION:

right shoulder pain



COMPARISON:

None available.

 

FINDINGS:



BONES and JOINT(S): No acute fracture or subluxation. No significant arthritis.

SOFT TISSUES: No significant abnormality.



ADDITIONAL FINDINGS: None.



IMPRESSION:

1. No acute findings.



Signer Name: Kole Francois MD 

Signed: 7/24/2021 1:18 AM

Workstation Name: [id number]